# Patient Record
Sex: FEMALE | NOT HISPANIC OR LATINO | Employment: UNEMPLOYED | URBAN - NONMETROPOLITAN AREA
[De-identification: names, ages, dates, MRNs, and addresses within clinical notes are randomized per-mention and may not be internally consistent; named-entity substitution may affect disease eponyms.]

---

## 2021-07-13 ENCOUNTER — PREP FOR SURGERY (OUTPATIENT)
Dept: OTHER | Facility: HOSPITAL | Age: 56
End: 2021-07-13

## 2021-07-13 DIAGNOSIS — Z12.11 SCREEN FOR COLON CANCER: Primary | ICD-10-CM

## 2021-07-13 RX ORDER — DEXTROSE AND SODIUM CHLORIDE 5; .45 G/100ML; G/100ML
100 INJECTION, SOLUTION INTRAVENOUS CONTINUOUS PRN
Status: CANCELLED | OUTPATIENT
Start: 2021-08-13

## 2021-08-10 RX ORDER — QUETIAPINE FUMARATE 50 MG/1
50 TABLET, FILM COATED ORAL NIGHTLY
COMMUNITY

## 2021-08-10 RX ORDER — ERGOCALCIFEROL 1.25 MG/1
50000 CAPSULE ORAL
COMMUNITY

## 2021-08-10 RX ORDER — DULOXETIN HYDROCHLORIDE 30 MG/1
30 CAPSULE, DELAYED RELEASE ORAL EVERY MORNING
COMMUNITY
Start: 2021-05-22

## 2021-08-10 RX ORDER — PREGABALIN 50 MG/1
50 CAPSULE ORAL NIGHTLY
COMMUNITY

## 2021-08-10 RX ORDER — LANOLIN ALCOHOL/MO/W.PET/CERES
500 CREAM (GRAM) TOPICAL DAILY
COMMUNITY

## 2021-08-13 ENCOUNTER — HOSPITAL ENCOUNTER (OUTPATIENT)
Facility: HOSPITAL | Age: 56
Setting detail: HOSPITAL OUTPATIENT SURGERY
Discharge: HOME | End: 2021-08-13
Attending: SURGERY | Admitting: SURGERY

## 2021-08-13 ENCOUNTER — ANESTHESIA EVENT (OUTPATIENT)
Dept: GASTROENTEROLOGY | Facility: HOSPITAL | Age: 56
End: 2021-08-13

## 2021-08-13 ENCOUNTER — ANESTHESIA (OUTPATIENT)
Dept: GASTROENTEROLOGY | Facility: HOSPITAL | Age: 56
End: 2021-08-13

## 2021-08-13 VITALS
OXYGEN SATURATION: 98 % | RESPIRATION RATE: 20 BRPM | HEIGHT: 62 IN | HEART RATE: 54 BPM | SYSTOLIC BLOOD PRESSURE: 97 MMHG | WEIGHT: 159 LBS | DIASTOLIC BLOOD PRESSURE: 57 MMHG | BODY MASS INDEX: 29.26 KG/M2 | TEMPERATURE: 98.6 F

## 2021-08-13 DIAGNOSIS — Z12.11 SCREEN FOR COLON CANCER: ICD-10-CM

## 2021-08-13 PROCEDURE — 25010000002 PROPOFOL 10 MG/ML EMULSION: Performed by: NURSE ANESTHETIST, CERTIFIED REGISTERED

## 2021-08-13 RX ORDER — DEXTROSE AND SODIUM CHLORIDE 5; .45 G/100ML; G/100ML
100 INJECTION, SOLUTION INTRAVENOUS CONTINUOUS PRN
Status: DISCONTINUED | OUTPATIENT
Start: 2021-08-13 | End: 2021-08-13 | Stop reason: HOSPADM

## 2021-08-13 RX ORDER — LIDOCAINE HYDROCHLORIDE 20 MG/ML
INJECTION, SOLUTION INTRAVENOUS AS NEEDED
Status: DISCONTINUED | OUTPATIENT
Start: 2021-08-13 | End: 2021-08-13 | Stop reason: SURG

## 2021-08-13 RX ORDER — PROPOFOL 10 MG/ML
VIAL (ML) INTRAVENOUS AS NEEDED
Status: DISCONTINUED | OUTPATIENT
Start: 2021-08-13 | End: 2021-08-13 | Stop reason: SURG

## 2021-08-13 RX ADMIN — LIDOCAINE HYDROCHLORIDE 80 MG: 20 INJECTION, SOLUTION INTRAVENOUS at 09:12

## 2021-08-13 RX ADMIN — PROPOFOL 30 MG: 10 INJECTION, EMULSION INTRAVENOUS at 09:16

## 2021-08-13 RX ADMIN — PROPOFOL 20 MG: 10 INJECTION, EMULSION INTRAVENOUS at 09:19

## 2021-08-13 RX ADMIN — PROPOFOL 120 MG: 10 INJECTION, EMULSION INTRAVENOUS at 09:12

## 2021-08-13 RX ADMIN — DEXTROSE AND SODIUM CHLORIDE 100 ML/HR: 5; 450 INJECTION, SOLUTION INTRAVENOUS at 08:30

## 2021-08-13 NOTE — H&P
No chief complaint on file.      Aaron Rose is a 56 y.o. female referred today for evaluation for colonoscopy.  She notes no change in bowel habits, no blood in the stool.     Prior Colonoscopy:yes  Prior Polyps:no  Family History of Colon Cancer:yes, father in 70's with stage 3 colon cancer  On anticoagulation:no    Past Surgical History:   Procedure Laterality Date   • BREAST LUMPECTOMY     • TONSILLECTOMY     • TUBAL ABDOMINAL LIGATION       Past Medical History:   Diagnosis Date   • Anxiety    • Chronic pain    • Fibromyalgia      Social History     Socioeconomic History   • Marital status:      Spouse name: Not on file   • Number of children: Not on file   • Years of education: Not on file   • Highest education level: Not on file   Tobacco Use   • Smoking status: Never Smoker   • Smokeless tobacco: Never Used   Vaping Use   • Vaping Use: Never used   Substance and Sexual Activity   • Alcohol use: Yes     Comment: occasional    • Drug use: Never   • Sexual activity: Defer     History reviewed. No pertinent family history.  No Known Allergies    Home Medications:  Prior to Admission medications    Medication Sig Start Date End Date Taking? Authorizing Provider   DULoxetine (CYMBALTA) 30 MG capsule Take 30 mg by mouth Every Morning. 5/22/21  Yes Nish Turner MD   pregabalin (LYRICA) 50 MG capsule Take 50 mg by mouth Every Night.   Yes iNsh Turner MD   QUEtiapine (SEROquel) 50 MG tablet Take 50 mg by mouth Every Night.   Yes Nish Turner MD   Vitamin B12 (CYANOCOBALAMIN) 500 MCG tablet tablet Take 500 mcg by mouth Daily.   Yes Nish Turner MD   vitamin D (ERGOCALCIFEROL) 1.25 MG (05759 UT) capsule capsule Take 50,000 Units by mouth Every 7 (Seven) Days.   Yes Nish Turner MD       Review of Systems   Constitutional: Negative.    HENT: Negative for hearing loss, nosebleeds and trouble swallowing.    Respiratory: Negative for apnea, chest tightness and  shortness of breath.    Cardiovascular: Negative for chest pain and palpitations.   Gastrointestinal: Negative for abdominal distention, abdominal pain, blood in stool, constipation, diarrhea, nausea and vomiting.   Genitourinary: Negative for difficulty urinating, dysuria, frequency and urgency.   Musculoskeletal: Negative for back pain, joint swelling and neck pain.   Skin: Negative for rash.   Neurological: Negative for dizziness, seizures, weakness, light-headedness, numbness and headaches.   Hematological: Negative for adenopathy.   Psychiatric/Behavioral: Negative for agitation. The patient is not nervous/anxious.        Vitals:    08/13/21 0822   BP: 122/81   Pulse: 62   Resp: 18   Temp: 98.5 °F (36.9 °C)   SpO2: 97%       Physical Exam  Constitutional:       General: She is not in acute distress.     Appearance: She is well-developed.   HENT:      Head: Normocephalic and atraumatic.   Eyes:      General: No scleral icterus.     Conjunctiva/sclera: Conjunctivae normal.   Neck:      Thyroid: No thyromegaly.      Trachea: No tracheal deviation.   Cardiovascular:      Rate and Rhythm: Normal rate and regular rhythm.      Heart sounds: Normal heart sounds. No murmur heard.   No friction rub. No gallop.    Pulmonary:      Effort: Pulmonary effort is normal. No respiratory distress.      Breath sounds: Normal breath sounds. No stridor. No wheezing or rales.   Chest:      Chest wall: No tenderness.   Abdominal:      General: Bowel sounds are normal. There is no distension.      Palpations: Abdomen is soft. There is no mass.      Tenderness: There is no abdominal tenderness. There is no guarding or rebound.      Hernia: No hernia is present.   Musculoskeletal:         General: No deformity. Normal range of motion.      Cervical back: Normal range of motion and neck supple.   Lymphadenopathy:      Cervical: No cervical adenopathy.   Skin:     General: Skin is warm and dry.      Coloration: Skin is not pale.       Findings: No erythema or rash.      Nails: There is no clubbing.   Neurological:      Mental Status: She is alert and oriented to person, place, and time.   Psychiatric:         Behavior: Behavior normal.         Thought Content: Thought content normal.         Assessment     In need of screening colonoscopy.    Plan     Risks, benefits, rationale and prep for colonoscopy have been discussed with the patient.  The patient indicates understanding of these issues and agrees with the plan.

## 2021-08-13 NOTE — ANESTHESIA PREPROCEDURE EVALUATION
Anesthesia Evaluation     Patient summary reviewed and Nursing notes reviewed   NPO Solid Status: > 8 hours  NPO Liquid Status: > 8 hours           Airway   Mallampati: II  TM distance: >3 FB  Neck ROM: full  No difficulty expected  Dental - normal exam     Pulmonary - negative pulmonary ROS and normal exam   Cardiovascular - negative cardio ROS and normal exam        Neuro/Psych  (+) psychiatric history Anxiety and Depression,     GI/Hepatic/Renal/Endo - negative ROS     Musculoskeletal (-) negative ROS    Abdominal  - normal exam   Substance History - negative use     OB/GYN negative ob/gyn ROS         Other                      Anesthesia Plan    ASA 2     MAC     intravenous induction     Anesthetic plan, all risks, benefits, and alternatives have been provided, discussed and informed consent has been obtained with: patient.

## 2021-08-13 NOTE — ANESTHESIA POSTPROCEDURE EVALUATION
Patient: Aaron Rose    Procedure Summary     Date: 08/13/21 Room / Location: [facility] ENDOSCOPY 2 / [facility] ENDOSCOPY    Anesthesia Start: 0911 Anesthesia Stop: 0927    Procedure: COLONOSCOPY (N/A ) Diagnosis:       Screen for colon cancer      (Screen for colon cancer [Z12.11])    Surgeons: Ammon Jameson MD Provider: Kate Gregg CRNA    Anesthesia Type: MAC ASA Status: 2          Anesthesia Type: MAC    Vitals  No vitals data found for the desired time range.          Post Anesthesia Care and Evaluation    Patient location during evaluation: bedside  Patient participation: complete - patient participated  Level of consciousness: awake  Pain score: 0  Pain management: adequate  Airway patency: patent  Anesthetic complications: No anesthetic complications  PONV Status: none  Cardiovascular status: acceptable  Respiratory status: acceptable  Hydration status: acceptable

## 2025-01-09 RX ORDER — ROPINIROLE 1 MG/1
1 TABLET, FILM COATED ORAL 3 TIMES DAILY
COMMUNITY

## 2025-01-09 RX ORDER — CHOLECALCIFEROL (VITAMIN D3) 1250 MCG
CAPSULE ORAL
COMMUNITY

## 2025-01-09 RX ORDER — TRAZODONE HYDROCHLORIDE 150 MG/1
150 TABLET ORAL NIGHTLY
COMMUNITY

## 2025-01-09 RX ORDER — TRIAMCINOLONE ACETONIDE 5 MG/G
CREAM TOPICAL 3 TIMES DAILY
COMMUNITY

## 2025-01-09 RX ORDER — BUPROPION HYDROCHLORIDE 150 MG/1
150 TABLET ORAL EVERY MORNING
COMMUNITY

## 2025-01-16 ENCOUNTER — OFFICE VISIT (OUTPATIENT)
Dept: NEUROLOGY | Age: 60
End: 2025-01-16
Payer: COMMERCIAL

## 2025-01-16 VITALS
HEIGHT: 62 IN | SYSTOLIC BLOOD PRESSURE: 127 MMHG | OXYGEN SATURATION: 98 % | DIASTOLIC BLOOD PRESSURE: 87 MMHG | HEART RATE: 67 BPM | BODY MASS INDEX: 31.1 KG/M2 | WEIGHT: 169 LBS

## 2025-01-16 DIAGNOSIS — G47.00 INSOMNIA, UNSPECIFIED TYPE: ICD-10-CM

## 2025-01-16 DIAGNOSIS — D50.9 IRON DEFICIENCY ANEMIA, UNSPECIFIED IRON DEFICIENCY ANEMIA TYPE: ICD-10-CM

## 2025-01-16 DIAGNOSIS — G25.81 RESTLESS LEG SYNDROME: Primary | ICD-10-CM

## 2025-01-16 DIAGNOSIS — R53.83 OTHER FATIGUE: ICD-10-CM

## 2025-01-16 DIAGNOSIS — R20.8 DYSESTHESIA OF MULTIPLE SITES: ICD-10-CM

## 2025-01-16 DIAGNOSIS — M79.7 FIBROMYALGIA: ICD-10-CM

## 2025-01-16 PROCEDURE — 99215 OFFICE O/P EST HI 40 MIN: CPT | Performed by: PHYSICIAN ASSISTANT

## 2025-01-16 RX ORDER — RIZATRIPTAN BENZOATE 10 MG/1
TABLET, ORALLY DISINTEGRATING ORAL
COMMUNITY
Start: 2024-07-25

## 2025-01-16 RX ORDER — ZOLPIDEM TARTRATE 10 MG/1
1 TABLET, FILM COATED ORAL NIGHTLY PRN
COMMUNITY
Start: 2025-01-09

## 2025-01-16 RX ORDER — FAMOTIDINE 40 MG/1
TABLET, FILM COATED ORAL
COMMUNITY
Start: 2024-12-04

## 2025-01-16 RX ORDER — DULOXETIN HYDROCHLORIDE 60 MG/1
60 CAPSULE, DELAYED RELEASE ORAL DAILY
COMMUNITY

## 2025-01-16 RX ORDER — SUMATRIPTAN 50 MG/1
TABLET, FILM COATED ORAL
COMMUNITY

## 2025-01-16 NOTE — PATIENT INSTRUCTIONS
Healthy Sleep Habits:  Maintain a healthy diet  Exercise regularly but not within 4 hours of bedtime  Assure your bed and bedroom are quiet, relaxing, comfortable (including temperature, darkness)  Establish and maintain a relaxing bedtime routine  Use your bedroom only for sleep and intimacy  Avoid caffeine, nicotine, or other stimulants within 4 hours of bedtime  Avoid food or alcohol within 2 hours of bedtime  Reduce fluid intake before bedtime  Limit exposure to bright light in the evenings. Turn off electronic devices at least 1 hour before bedtime  Exposure to sunrise for 15 minutes each day and sunset for 15 minutes each evening  Broad spectrum light-is a type of light that covers a wide range of wavelengths. This type of light is designed to mimic natural sunlight and is often used to treat various health conditions like seasonal affective disorder and sleep disorders.  Broad spectrum light-ensure the light box you choose emits 10,000 lux of light and filters 99% of UV rays. Position the light box at eye level or higher away from your face. The light should be positioned to the left or right, not directly in front of you. Use the light box within the first hour of waking up in the morning for 20-30 minutes. This helps regulate your circadian rhythm and improve mood. Use the light box daily. Always follow the 's instructions for optimal use and safety.

## 2025-01-16 NOTE — PROGRESS NOTES
REVIEW OF SYSTEMS    Constitutional: []Fever []Sweats []Chills [] Recent Injury [x] Denies all unless marked  HEENT:[]Headache  [] Head Injury [] Hearing Loss  [] Sore Throat  [] Ear Ache [x] Denies all unless marked  Spine:  [] Neck pain  [] Back pain  [] Sciaticia  [x] Denies all unless marked  Cardiovascular:[]Heart Disease []Palpitations [] Chest Pain   [x] Denies all unless marked  Pulmonary: []Shortness of Breath []Cough   [x] Denies all unless marked  Psychiatric/Behavioral:[] Depression [] Anxiety [x] Denies all unless marked  Gastrointestinal: []Nausea  []Vomiting  []Abdominal Pain  []Constipation  []Diarrhea  [x] Denies all unless marked  Genitourinary:   [] Frequency  [] Urgency  [] Dysuria [] Incontinence  [x] Denies all unless marked  Extremities: []Pain  []Swelling  [x] Denies all unless marked  Musculoskeletal: [] Myalgias  [] Joint Pain  [] Arthritis [] Muscle Cramps [] Muscle Twitches  [x] Denies all unless marked  Sleep: [x]Insomnia[x]Snoring [x]Restless Legs  []Sleep Apnea  []Daytime Sleepiness  [x] Denies all unless marked  Skin:[] Rash [] Color Change [x] Denies all unless marked   Neurological:[]Visual Disturbance [] Memory Loss []Loss of Balance []Slurred Speech []Weakness []Seizures  [] Dizziness [x] Denies all unless marked     
months-pt to call for lab results, as she will be obtaining the labs at an outside facility.      The patient indicates understanding of the above issues and agrees with the care plan, but desires to initiate lab studies with results review prior to additional diagnostic studies.       I spent 45 minutes caring for Bibi Ca  on this date of service. This time includes time spent by me in the following activities:preparing for the visit, reviewing tests, performing a medically appropriate examination and/or evaluation , counseling and educating the patient/family/caregiver, ordering medications, tests, or procedures, documenting information in the medical record and care coordination      The patient (or guardian, if applicable) and other individuals in attendance with the patient were advised that Artificial Intelligence will be utilized during this visit to record, process the conversation to generate a clinical note, and support improvement of the AI technology. The patient (or guardian, if applicable) and other individuals in attendance at the appointment consented to the use of AI, including the recording.

## 2025-01-18 PROBLEM — G47.00 INSOMNIA: Status: ACTIVE | Noted: 2025-01-18

## 2025-01-18 PROBLEM — R20.8 DYSESTHESIA OF MULTIPLE SITES: Status: ACTIVE | Noted: 2025-01-18

## 2025-01-27 ENCOUNTER — TELEPHONE (OUTPATIENT)
Dept: NEUROLOGY | Age: 60
End: 2025-01-27

## 2025-01-28 NOTE — TELEPHONE ENCOUNTER
Spoke with patient, letting her know Andreia would like her to complete additional labs. Patient requested the orders to be sent to her PCP.

## 2025-01-28 NOTE — TELEPHONE ENCOUNTER
Reviewed PCP labs from 11/2024. I ordered additional labs and would like her to obtain those, as well please.

## 2025-02-24 ENCOUNTER — TELEPHONE (OUTPATIENT)
Dept: NEUROLOGY | Age: 60
End: 2025-02-24

## 2025-02-24 NOTE — TELEPHONE ENCOUNTER
Pt called requesting info if she can get labs drawn here at [facility]. Apparently there has been some issue with her being able to get her labs drawn at different facilities. She advised her pcp is on maternity leave and that office would not draw labs on her since they didn't order it. She would like to come to [facility] and I advised her where the lab is. Advised her if she had any trouble to let us know before going home. She voiced understanding  Tiera santos

## 2025-02-27 DIAGNOSIS — D50.9 IRON DEFICIENCY ANEMIA, UNSPECIFIED IRON DEFICIENCY ANEMIA TYPE: ICD-10-CM

## 2025-02-27 DIAGNOSIS — R53.83 OTHER FATIGUE: ICD-10-CM

## 2025-02-27 LAB
ALBUMIN SERPL-MCNC: 4.5 G/DL (ref 3.5–5.2)
ALP SERPL-CCNC: 103 U/L (ref 35–104)
ALT SERPL-CCNC: 19 U/L (ref 5–33)
ANION GAP SERPL CALCULATED.3IONS-SCNC: 10 MMOL/L (ref 8–16)
AST SERPL-CCNC: 19 U/L (ref 5–32)
BASOPHILS # BLD: 0.1 K/UL (ref 0–0.2)
BASOPHILS NFR BLD: 0.8 % (ref 0–1)
BILIRUB SERPL-MCNC: 0.3 MG/DL (ref 0.2–1.2)
BUN SERPL-MCNC: 12 MG/DL (ref 6–20)
CALCIUM SERPL-MCNC: 9.6 MG/DL (ref 8.6–10)
CHLORIDE SERPL-SCNC: 102 MMOL/L (ref 98–107)
CO2 SERPL-SCNC: 30 MMOL/L (ref 22–29)
CREAT SERPL-MCNC: 0.7 MG/DL (ref 0.5–0.9)
EOSINOPHIL # BLD: 0.2 K/UL (ref 0–0.6)
EOSINOPHIL NFR BLD: 2.2 % (ref 0–5)
ERYTHROCYTE [DISTWIDTH] IN BLOOD BY AUTOMATED COUNT: 12.5 % (ref 11.5–14.5)
FERRITIN SERPL-MCNC: 492.6 NG/ML (ref 13–150)
FOLATE SERPL-MCNC: 17.7 NG/ML (ref 4.8–37.3)
GLUCOSE SERPL-MCNC: 96 MG/DL (ref 70–99)
HCT VFR BLD AUTO: 41.6 % (ref 37–47)
HGB BLD-MCNC: 13.3 G/DL (ref 12–16)
IMM GRANULOCYTES # BLD: 0 K/UL
IRON SATN MFR SERPL: 23 % (ref 15–50)
IRON SERPL-MCNC: 58 UG/DL (ref 37–145)
LYMPHOCYTES # BLD: 2.4 K/UL (ref 1.1–4.5)
LYMPHOCYTES NFR BLD: 30.6 % (ref 20–40)
MCH RBC QN AUTO: 30 PG (ref 27–31)
MCHC RBC AUTO-ENTMCNC: 32 G/DL (ref 33–37)
MCV RBC AUTO: 93.7 FL (ref 81–99)
MONOCYTES # BLD: 0.6 K/UL (ref 0–0.9)
MONOCYTES NFR BLD: 8.2 % (ref 0–10)
NEUTROPHILS # BLD: 4.5 K/UL (ref 1.5–7.5)
NEUTS SEG NFR BLD: 57.8 % (ref 50–65)
PLATELET # BLD AUTO: 324 K/UL (ref 130–400)
PMV BLD AUTO: 9.8 FL (ref 9.4–12.3)
POTASSIUM SERPL-SCNC: 4.5 MMOL/L (ref 3.5–5.1)
PROT SERPL-MCNC: 8.1 G/DL (ref 6.4–8.3)
RBC # BLD AUTO: 4.44 M/UL (ref 4.2–5.4)
SODIUM SERPL-SCNC: 142 MMOL/L (ref 136–145)
T4 FREE SERPL-MCNC: 0.92 NG/DL (ref 0.93–1.7)
TIBC SERPL-MCNC: 257 UG/DL (ref 250–400)
TSH SERPL DL<=0.005 MIU/L-ACNC: 1.93 UIU/ML (ref 0.27–4.2)
VIT B12 SERPL-MCNC: 788 PG/ML (ref 232–1245)
WBC # BLD AUTO: 7.8 K/UL (ref 4.8–10.8)

## 2025-02-28 ENCOUNTER — TELEPHONE (OUTPATIENT)
Dept: NEUROLOGY | Age: 60
End: 2025-02-28

## 2025-02-28 NOTE — TELEPHONE ENCOUNTER
----- Message from CALVIN Martinez sent at 2/27/2025  9:35 PM CST -----  Please let her know that the ferritin level is high. She has a hx of anemia. This may suggest iron overload if she is supplementing with iron? Sometimes an elevated ferritin can be seen if liver injury but her liver enzymes were normal. Also elevated ferritin can be seen in inflammation. I would recommend follow up with her hematologist who has managed the anemia.  So the RLS is not related to ferritin levels because they are not low. Does she want to try another medication for RLS?   She had also mentioned snoring and insomnia. Will she consider a sleep study?  She also had c/o episodic tingling. I recommended to consider further evaluation with MRI brain and C-spine. Does she want to proceed with that?  The T4 was marginally decreased but not significant for thyroid disease, as her TSH is normal.

## 2025-02-28 NOTE — TELEPHONE ENCOUNTER
Spoke with patient, she understands results given above. Patient stated she was dismissed from hematologist because her levels were back to normal. Can you place a referral to [facility], Dr. Bailey?    Patient would like to try a new RLS medication, she would also like to proceed with a sleep study and the imaging.

## 2025-03-03 DIAGNOSIS — R20.8 DYSESTHESIA OF MULTIPLE SITES: Primary | ICD-10-CM

## 2025-03-03 DIAGNOSIS — G47.00 INSOMNIA, UNSPECIFIED TYPE: ICD-10-CM

## 2025-03-03 DIAGNOSIS — R29.2 HOFFMAN SIGN PRESENT: ICD-10-CM

## 2025-03-03 DIAGNOSIS — R79.89 ELEVATED FERRITIN LEVEL: Primary | ICD-10-CM

## 2025-03-03 DIAGNOSIS — R29.2 HYPERREFLEXIC: ICD-10-CM

## 2025-03-03 DIAGNOSIS — R06.83 SNORING: Primary | ICD-10-CM

## 2025-03-03 DIAGNOSIS — R53.83 OTHER FATIGUE: ICD-10-CM

## 2025-03-03 DIAGNOSIS — D50.9 IRON DEFICIENCY ANEMIA, UNSPECIFIED IRON DEFICIENCY ANEMIA TYPE: ICD-10-CM

## 2025-03-03 DIAGNOSIS — G25.81 RESTLESS LEG SYNDROME: ICD-10-CM

## 2025-03-04 NOTE — TELEPHONE ENCOUNTER
Spoke with patient, let her know franco ordered MRI brain and C-Spine, she was given [phone] to [facility] and schedule.  She is aware the sleep lab will call her to set up appt for the sleep study and Franco has dropped the referral for her Hematologist and that office should be reaching out. I also voiced Franco wants to wait til after she gets sleep study results to change medication.

## 2025-03-13 ENCOUNTER — HOSPITAL ENCOUNTER (OUTPATIENT)
Dept: MRI IMAGING | Age: 60
Discharge: HOME | End: 2025-03-13
Payer: COMMERCIAL

## 2025-03-13 DIAGNOSIS — R29.2 HOFFMAN SIGN PRESENT: ICD-10-CM

## 2025-03-13 DIAGNOSIS — R20.8 DYSESTHESIA OF MULTIPLE SITES: ICD-10-CM

## 2025-03-13 DIAGNOSIS — R29.2 HYPERREFLEXIC: ICD-10-CM

## 2025-03-13 PROCEDURE — 6360000004 HC RX CONTRAST MEDICATION: Performed by: PHYSICIAN ASSISTANT

## 2025-03-13 PROCEDURE — 72156 MRI NECK SPINE W/O & W/DYE: CPT

## 2025-03-13 PROCEDURE — 70553 MRI BRAIN STEM W/O & W/DYE: CPT

## 2025-03-13 PROCEDURE — A9577 INJ MULTIHANCE: HCPCS | Performed by: PHYSICIAN ASSISTANT

## 2025-03-13 RX ADMIN — GADOBENATE DIMEGLUMINE 16 ML: 529 INJECTION, SOLUTION INTRAVENOUS at 13:03

## 2025-03-27 ENCOUNTER — RESULTS FOLLOW-UP (OUTPATIENT)
Dept: NEUROLOGY | Age: 60
End: 2025-03-27

## 2025-03-27 DIAGNOSIS — R20.8 DYSESTHESIA OF MULTIPLE SITES: Primary | ICD-10-CM

## 2025-03-27 DIAGNOSIS — R29.2 ABNORMAL REFLEXES: ICD-10-CM

## 2025-03-27 DIAGNOSIS — M79.7 FIBROMYALGIA: ICD-10-CM

## 2025-03-27 DIAGNOSIS — R53.83 OTHER FATIGUE: ICD-10-CM

## 2025-04-28 ENCOUNTER — HOSPITAL ENCOUNTER (OUTPATIENT)
Dept: SLEEP CENTER | Age: 60
Discharge: HOME | End: 2025-04-30
Payer: COMMERCIAL

## 2025-04-28 DIAGNOSIS — R53.83 OTHER FATIGUE: ICD-10-CM

## 2025-04-28 DIAGNOSIS — G47.00 INSOMNIA, UNSPECIFIED TYPE: ICD-10-CM

## 2025-04-28 DIAGNOSIS — G25.81 RESTLESS LEG SYNDROME: ICD-10-CM

## 2025-04-28 DIAGNOSIS — R06.83 SNORING: ICD-10-CM

## 2025-04-28 PROCEDURE — 95810 POLYSOM 6/> YRS 4/> PARAM: CPT

## 2025-04-29 NOTE — PROGRESS NOTES
[facility]  [address] [place]  [address]  Phone [phone] Fax [phone]     Sleep Study Technician Review    Patient Name:  Bibi Ca  :   1965  Referring Provider: Andreia Quiroga PA    [facility] Fall Risk Assessment  Have you fallen in the past year? YES[] NO[x]  Do you feel unsteady when standing or walking? YES[] NO[x]  Are you worried about falling? YES[] NO[x]   aFall Risk screening requirement has been met  Not at risk for falls.    Brief History:  Bibi Ca is a 60 y.o. female with a history of Anxiety, Depression, Fatigue, Fibromylagia, GERDS, Migraine, Insomnia, RLS and Snoring who has been referred for a sleep study. She has a history of 3 natural childbirths and recalls experiencing severe restless leg syndrome (RLS) during her second pregnancy in . The RLS was so severe that it disrupted her sleep, causing significant sleep deprivation. She describes the sensation as a sudden jerking movement in her legs as she begins to relax and fall asleep. She also reports experiencing RLS symptoms in her right leg while sitting and relaxing, such as during Baptism or waiting for an appointment. She reports that her RLS symptoms are not consistent and can be triggered by both prolonged standing and sitting. She finds that walking does not seem to exacerbate her symptoms. She takes her medication 2 hours before bedtime, which helps her fall asleep but often results in her falling asleep on the couch. She avoids napping during the day to prevent nighttime sleep disturbances. She once increased her ropinirole dose from 4 mg to 6 mg due to severe symptoms, but this resulted in daytime drowsiness. She rates her excessive daytime somnolence as 2 on the [place] Sleepiness Scale. She reports feeling fatigued throughout the day but does not fall asleep or doze off when sedentary. She has been experiencing RLS symptoms in her upper extremities and body

## 2025-05-22 ENCOUNTER — TELEPHONE (OUTPATIENT)
Dept: NEUROLOGY | Age: 60
End: 2025-05-22

## 2025-05-22 NOTE — TELEPHONE ENCOUNTER
The lab results for the protein electrophoresis and the Lyme are not clear as to the result, it says see scanned report. Would you please check into this?

## 2025-05-26 DIAGNOSIS — G47.33 SLEEP APNEA, OBSTRUCTIVE: Primary | ICD-10-CM

## 2025-05-27 ENCOUNTER — PATIENT MESSAGE (OUTPATIENT)
Dept: NEUROLOGY | Age: 60
End: 2025-05-27

## 2025-06-03 ENCOUNTER — TELEPHONE (OUTPATIENT)
Dept: NEUROLOGY | Age: 60
End: 2025-06-03

## 2025-06-03 RX ORDER — DOXYCYCLINE HYCLATE 100 MG
TABLET ORAL
Qty: 28 TABLET | Refills: 0 | Status: SHIPPED | OUTPATIENT
Start: 2025-06-03

## 2025-06-04 NOTE — TELEPHONE ENCOUNTER
Please let her know that we finally received the rest of her lab results. The SPEP is normal. The Lymes titer does indicate that it is negative for current Lyme disease but it did show a positive IgM P39 antibody result which can indicate that she was exposed to the bacteria that causes Lyme disease, Borrelia burgdorferi fairly recently. However, this result alone done not confirm active Lyme disease. Some people may have a positive result without current symptoms or the finding might represent past exposure. She did express multiple arthralgias.Could repeat the test but I would recommend a referral to Infectious Disease, which would be Dr. Bora Villalobos. I am also going to e-scribed Doxycyline 100 mg bid x 14 days. Please let me know if she wants to proceed with the referral to Dr. Villalobos.

## 2025-06-05 DIAGNOSIS — R89.9 ABNORMAL LABORATORY TEST RESULT: ICD-10-CM

## 2025-06-05 DIAGNOSIS — A69.20 LYME DISEASE: Primary | ICD-10-CM

## 2025-06-05 DIAGNOSIS — M25.50 ARTHRALGIA, UNSPECIFIED JOINT: ICD-10-CM

## 2025-06-18 DIAGNOSIS — M50.30 DEGENERATIVE DISC DISEASE, CERVICAL: ICD-10-CM

## 2025-06-18 DIAGNOSIS — R29.2 HYPERREFLEXIA: ICD-10-CM

## 2025-06-18 DIAGNOSIS — M48.02 FORAMINAL STENOSIS OF CERVICAL REGION: Primary | ICD-10-CM

## 2025-06-18 DIAGNOSIS — R20.8 DYSESTHESIA OF MULTIPLE SITES: ICD-10-CM

## 2025-06-20 NOTE — PROGRESS NOTES
[facility] [place] [facility] [place] Consult [place]    Patient:  Aaron Rose  YOB: 1965  MRN: [id number]   Primary Care Physician: Keke Morse MD  Referring Physician: Tammie Nava PA-C     Chief Complaint: Lyme disease, unspecified   >>> Per patient at rest involuntary movements, head, shoulder, legs. She states that she has extreme fatigue,startled easily.<<<< C DONNA Zamora    Interval History/HPI: Pleasant 60-year-old woman.  She was referred because of Lyme testing.  When asking her for a chief complaint/description of her symptoms she had initially focused on a prior history of fibromyalgia and fatigue.  She indicates she had been diagnosed with fibromyalgia quite a while back.  She indicates she thought a lot of her symptoms were related to fibro-.  She indicates she saw her primary care physician last fall.  She indicates she was diagnosed with restless leg.  She was started on some medication treatment.  She was also referred to neurology.  She indicates when she saw neurology there were some ferritin issues and also an anemia problem.  From what she describes they had done a large panel of tests and at some point she was told she had unspecified Lyme disease.  She indicates she is not sure what that meant.  I did not get the sense that she has had any antibiotic treatment of any kind.  As we talked further I tried to get her to focus more on the symptoms that led to this appointment and describe when she felt those symptoms started.  She indicated in January she started to develop some uncontrolled movements first involving her right foot and right leg and then subsequently right shoulder and occasionally her right face.  She indicates she looked up some words to try and describe the symptoms and she indicated involuntary jerking, shuttering, or startling movements.  She notices it more when she is sleeping but she is not sure if that is because when she is up and moving during the day  she just does not notice it as much is when she is at rest.  She indicates she has had MRIs I believe the brain and spine and she reports a diagnosis of some cervical spinal stenosis for which she has been referred to physical therapy.  She also indicates as a part of her evaluation she was referred for a sleep study.  She indicates she was diagnosed with sleep apnea but has not yet had a titration study to adjust CPAP/BiPAP for treatment.  She indicates the question issue of possible Lyme disease came up as result of laboratory testing.  She did not report a lot of outdoor exposure, but she does indicate her  does work as a farmer and brings home ticks not infrequently.    Allergies: No Known Allergies    Current Scheduled Medications:   Current Outpatient Medications on File Prior to Visit   Medication Sig    DULoxetine (CYMBALTA) 30 MG capsule Take 1 capsule by mouth Every Morning.    Nutritional Supplements (JUICE PLUS FIBRE PO)     pregabalin (LYRICA) 50 MG capsule Take 1 capsule by mouth Every Night.    rOPINIRole (REQUIP) 4 MG tablet Take 1 tablet by mouth every night at bedtime.    SUMAtriptan (IMITREX) 50 MG tablet Take 1 tablet by mouth 1 (One) Time As Needed.    Vitamin B12 (CYANOCOBALAMIN) 500 MCG tablet tablet Take 1 tablet by mouth Daily.    vitamin D (ERGOCALCIFEROL) 1.25 MG (33843 UT) capsule capsule Take 1 capsule by mouth Every 7 (Seven) Days.    zolpidem (Ambien) 10 MG tablet Take 1 tablet by mouth At Night As Needed.    QUEtiapine (SEROquel) 50 MG tablet Take 1 tablet by mouth Every Night.     No current facility-administered medications on file prior to visit.     Venous Access Review  Line/IV site: No current IV Access    Antimicrobial Review  Currently on antibiotics/antifungals: YES/NO: NO  Start Date of Therapy: 06-: doxycyline   If therapy completed, date complete: 06-      Past Medical History:   Diagnosis Date    Anxiety     Chronic pain     Fibromyalgia    She had  "had a prior history of anemia for which she received iron transfusion per chart review.  She also had a prior history of MDS diagnosis which she indicates was reversed.  She has had some history of depression/anxiety.  She reports a relatively new diagnosis of sleep apnea.    No past medical history pertinent negatives.  Past Surgical History:   Procedure Laterality Date    BREAST LUMPECTOMY      COLONOSCOPY N/A 8/13/2021    Procedure: COLONOSCOPY;  Surgeon: Ammon Jameson MD;  Location: [facility] ENDOSCOPY;  Service: General;  Laterality: N/A;    TONSILLECTOMY      TUBAL ABDOMINAL LIGATION       Family History   Problem Relation Age of Onset    Other (back promblems) Mother     Cancer Father      Social History     Socioeconomic History    Marital status:    Tobacco Use    Smoking status: Never    Smokeless tobacco: Never   Vaping Use    Vaping status: Never Used   Substance and Sexual Activity    Alcohol use: Yes     Comment: occasional     Drug use: Never    Sexual activity: Defer   She indicates she has had some stressful events.  She indicates her daughter moved out of their home at age 18 about 3 years ago.  They do not have any contact with her.  She described that situation bothering her.  She does have 2 sons who she indicates she remains in normal contact with.    Exposure History: She indicates her  works as a farmer and she has had some tick exposure through him.  She did not report any other significant exposures.    Review of Systems See HPI.    Vital Signs:  BP (!) 156/105 (BP Location: Left arm, Patient Position: Sitting, Cuff Size: Adult)   Pulse 88   Temp 98.5 °F (36.9 °C) (Oral)   Ht 157.5 cm (62\")   Wt 78.6 kg (173 lb 3.2 oz)   SpO2 97%   BMI 31.68 kg/m²     Physical Exam  Vital signs - reviewed.  Alert, pleasant, tired and somewhat anxious appearing but in no distress.  She would occasionally have a slight involuntary movement of her right arm such as rolling the right " shoulder and right arm.  I did not see in lot of other involuntary movements during the course of our discussion today.  I saw maybe 1 or 2 apparent involuntary movements involving her right side.  Lungs were clear to auscultation without crackles  Heart regular rhythm without murmur  Abdomen is soft and nontender without hepatosplenomegaly  Extremities without edema    Lab/Imaging/Other Information:     Labs from April 23, 2025 reviewed.  A copy of the lab given to patient and discussed with patient.  Progress notes outlined below reviewed as well.  LABORATORY - SCAN - LABS FROM [facility] 4/23/25 (04/23/2025)     Lyme IgG done April 23, 2025:        PROGRESS NOTES - SCAN - PROG NOTE_[facility]_04/16/25 (04/16/2025)     PROGRESS NOTES - SCAN - PROG NOTE_[facility]_03/31/25 (03/31/2025)     LABORATORY - SCAN - LABS COLLECTED FROM [facility] (03/31/2025)     NEUROLOGY - SCAN - [facility] -01/16/2025 (01/16/2025)       Impression & Recommendations:   Diagnoses and all orders for this visit:    1. Fatigue, unspecified type (Primary)  -     C-reactive Protein; Future  -     CBC & Differential; Future  -     T4, Free; Future  -     TSH; Future  -     Ferritin; Future  -     Comprehensive Metabolic Panel; Future    2. Multifocal myoclonus    3. Borderline results on serologic testing for Lyme disease    Discussed her lab results with her.  Explained her Lyme test is actually a negative test.  I explained with only 1 of 3 bands in the IgM portion that portion would be negative.  I explained with 0 of 10 bands (not the 5 of 10 bands required) in the IgG portion that her Lyme IgG is also negative.  Her history would be very atypical for Lyme.  Her history would also be atypical for other types of infection.  At this point I lean against infection as a cause for her symptoms.  Would recommend ongoing neurology evaluation for her problems with multifocal myoclonus.  Will check laboratory  work today to further evaluate fatigue.  She can call for lab results.  Would encourage her to keep follow-up with her primary care physician and neurology.  At this point I lean against any type of infection but I would be happy to reassess at any point if the patient or her other treating physicians would like me to do so.    Follow Up:     There are no Patient Instructions on file for this visit.  Return for She can follow-up with infectious diseases as needed, but happy to reassess at any point..  Patient was provided After Visit Summary.     Benoit Ching MD    CC: JOSE Baca PA

## 2025-06-23 DIAGNOSIS — M48.02 FORAMINAL STENOSIS OF CERVICAL REGION: Primary | ICD-10-CM

## 2025-06-23 DIAGNOSIS — R20.8 DYSESTHESIA OF MULTIPLE SITES: ICD-10-CM

## 2025-06-23 DIAGNOSIS — M50.30 DEGENERATIVE DISC DISEASE, CERVICAL: ICD-10-CM

## 2025-06-23 DIAGNOSIS — R29.2 HYPERREFLEXIA: ICD-10-CM

## 2025-06-24 ENCOUNTER — OFFICE VISIT (OUTPATIENT)
Age: 60
End: 2025-06-24
Payer: MEDICAID

## 2025-06-24 ENCOUNTER — LAB (OUTPATIENT)
Dept: LAB | Facility: HOSPITAL | Age: 60
End: 2025-06-24
Payer: MEDICAID

## 2025-06-24 VITALS
BODY MASS INDEX: 31.87 KG/M2 | TEMPERATURE: 98.5 F | DIASTOLIC BLOOD PRESSURE: 105 MMHG | HEART RATE: 88 BPM | HEIGHT: 62 IN | SYSTOLIC BLOOD PRESSURE: 156 MMHG | WEIGHT: 173.2 LBS | OXYGEN SATURATION: 97 %

## 2025-06-24 DIAGNOSIS — R76.8 BORDERLINE RESULTS ON SEROLOGIC TESTING FOR LYME DISEASE: ICD-10-CM

## 2025-06-24 DIAGNOSIS — R53.83 FATIGUE, UNSPECIFIED TYPE: ICD-10-CM

## 2025-06-24 DIAGNOSIS — R53.83 FATIGUE, UNSPECIFIED TYPE: Primary | ICD-10-CM

## 2025-06-24 DIAGNOSIS — G25.3 MULTIFOCAL MYOCLONUS: ICD-10-CM

## 2025-06-24 LAB
ALBUMIN SERPL-MCNC: 4.4 G/DL (ref 3.5–5.2)
ALBUMIN/GLOB SERPL: 1.3 G/DL
ALP SERPL-CCNC: 98 U/L (ref 39–117)
ALT SERPL W P-5'-P-CCNC: 29 U/L (ref 1–33)
ANION GAP SERPL CALCULATED.3IONS-SCNC: 12 MMOL/L (ref 5–15)
AST SERPL-CCNC: 28 U/L (ref 1–32)
BASOPHILS # BLD AUTO: 0.07 10*3/MM3 (ref 0–0.2)
BASOPHILS NFR BLD AUTO: 0.9 % (ref 0–1.5)
BILIRUB SERPL-MCNC: 0.2 MG/DL (ref 0–1.2)
BUN SERPL-MCNC: 10.5 MG/DL (ref 8–23)
BUN/CREAT SERPL: 15.9 (ref 7–25)
CALCIUM SPEC-SCNC: 9.6 MG/DL (ref 8.6–10.5)
CHLORIDE SERPL-SCNC: 103 MMOL/L (ref 98–107)
CO2 SERPL-SCNC: 26 MMOL/L (ref 22–29)
CREAT SERPL-MCNC: 0.66 MG/DL (ref 0.57–1)
CRP SERPL-MCNC: 1.72 MG/DL (ref 0–0.5)
DEPRECATED RDW RBC AUTO: 41.2 FL (ref 37–54)
EGFRCR SERPLBLD CKD-EPI 2021: 100.6 ML/MIN/1.73
EOSINOPHIL # BLD AUTO: 0.17 10*3/MM3 (ref 0–0.4)
EOSINOPHIL NFR BLD AUTO: 2.1 % (ref 0.3–6.2)
ERYTHROCYTE [DISTWIDTH] IN BLOOD BY AUTOMATED COUNT: 12.5 % (ref 12.3–15.4)
FERRITIN SERPL-MCNC: 466.3 NG/ML (ref 13–150)
GLOBULIN UR ELPH-MCNC: 3.4 GM/DL
GLUCOSE SERPL-MCNC: 105 MG/DL (ref 65–99)
HCT VFR BLD AUTO: 39.7 % (ref 34–46.6)
HGB BLD-MCNC: 12.9 G/DL (ref 12–15.9)
IMM GRANULOCYTES # BLD AUTO: 0.04 10*3/MM3 (ref 0–0.05)
IMM GRANULOCYTES NFR BLD AUTO: 0.5 % (ref 0–0.5)
LYMPHOCYTES # BLD AUTO: 2.39 10*3/MM3 (ref 0.7–3.1)
LYMPHOCYTES NFR BLD AUTO: 29.6 % (ref 19.6–45.3)
MCH RBC QN AUTO: 29.5 PG (ref 26.6–33)
MCHC RBC AUTO-ENTMCNC: 32.5 G/DL (ref 31.5–35.7)
MCV RBC AUTO: 90.6 FL (ref 79–97)
MONOCYTES # BLD AUTO: 0.69 10*3/MM3 (ref 0.1–0.9)
MONOCYTES NFR BLD AUTO: 8.6 % (ref 5–12)
NEUTROPHILS NFR BLD AUTO: 4.71 10*3/MM3 (ref 1.7–7)
NEUTROPHILS NFR BLD AUTO: 58.3 % (ref 42.7–76)
NRBC BLD AUTO-RTO: 0 /100 WBC (ref 0–0.2)
PLATELET # BLD AUTO: 302 10*3/MM3 (ref 140–450)
PMV BLD AUTO: 9.9 FL (ref 6–12)
POTASSIUM SERPL-SCNC: 4.1 MMOL/L (ref 3.5–5.2)
PROT SERPL-MCNC: 7.8 G/DL (ref 6–8.5)
RBC # BLD AUTO: 4.38 10*6/MM3 (ref 3.77–5.28)
SODIUM SERPL-SCNC: 141 MMOL/L (ref 136–145)
T4 FREE SERPL-MCNC: 0.85 NG/DL (ref 0.92–1.68)
TSH SERPL DL<=0.05 MIU/L-ACNC: 2.91 UIU/ML (ref 0.27–4.2)
WBC NRBC COR # BLD AUTO: 8.07 10*3/MM3 (ref 3.4–10.8)

## 2025-06-24 PROCEDURE — 99244 OFF/OP CNSLTJ NEW/EST MOD 40: CPT | Performed by: INTERNAL MEDICINE

## 2025-06-24 PROCEDURE — 80053 COMPREHEN METABOLIC PANEL: CPT

## 2025-06-24 PROCEDURE — 82728 ASSAY OF FERRITIN: CPT

## 2025-06-24 PROCEDURE — 84443 ASSAY THYROID STIM HORMONE: CPT

## 2025-06-24 PROCEDURE — 85025 COMPLETE CBC W/AUTO DIFF WBC: CPT

## 2025-06-24 PROCEDURE — 86140 C-REACTIVE PROTEIN: CPT

## 2025-06-24 PROCEDURE — 36415 COLL VENOUS BLD VENIPUNCTURE: CPT

## 2025-06-24 PROCEDURE — 84439 ASSAY OF FREE THYROXINE: CPT

## 2025-06-24 RX ORDER — ROPINIROLE 4 MG/1
1 TABLET, FILM COATED ORAL
COMMUNITY

## 2025-06-24 RX ORDER — SUMATRIPTAN 50 MG/1
50 TABLET, FILM COATED ORAL ONCE AS NEEDED
COMMUNITY

## 2025-06-24 RX ORDER — ZOLPIDEM TARTRATE 10 MG/1
10 TABLET ORAL NIGHTLY PRN
COMMUNITY
Start: 2025-01-09

## 2025-06-25 ENCOUNTER — TELEPHONE (OUTPATIENT)
Age: 60
End: 2025-06-25

## 2025-06-25 NOTE — TELEPHONE ENCOUNTER
Patient called today and left a message asking if Dr. Ching was going to refer her to a different neurologist.  She asked for a return call at [phone].

## 2025-06-27 NOTE — TELEPHONE ENCOUNTER
I called and spoke with patient and told her I received her message and I am waiting for Dr. Ching to finish her note from this week.  I told her I will check with him next week and let her know something as soon as possible.

## 2025-07-07 ENCOUNTER — TELEPHONE (OUTPATIENT)
Age: 60
End: 2025-07-07

## 2025-07-07 NOTE — TELEPHONE ENCOUNTER
Patient called and left a voicemail message today at 2:18 PM calling to find out about her lab results and to see if Dr. Ching has referred her to a neurologist.  She asked for a return call at [phone].      16:48 CDT  I returned her call and informed her that Dr. Ching spoke with her primary care physician in [place] Dr. Morse.  I reviewed my findings with her.  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  We discussed assisting and referring her for additional neurology opinion.  Dr. Morse is down to see her next week.  She is also going to work with the patient to help get her additional neurology opinion.  Patient confirmed she has an appt with Dr. Morse next week.  I will make sure her note and lab results are send to Dr. Morse for her to review.  She said she appreciated all of my communication.      Labs and consult note were faxed to Dr. Morse at [phone].

## 2025-07-21 ENCOUNTER — TELEPHONE (OUTPATIENT)
Dept: NEUROLOGY | Age: 60
End: 2025-07-21

## 2025-08-04 ENCOUNTER — OFFICE VISIT (OUTPATIENT)
Dept: NEUROLOGY | Age: 60
End: 2025-08-04
Payer: COMMERCIAL

## 2025-08-04 VITALS
SYSTOLIC BLOOD PRESSURE: 156 MMHG | RESPIRATION RATE: 16 BRPM | OXYGEN SATURATION: 96 % | WEIGHT: 169 LBS | BODY MASS INDEX: 31.1 KG/M2 | HEART RATE: 100 BPM | HEIGHT: 62 IN | DIASTOLIC BLOOD PRESSURE: 90 MMHG

## 2025-08-04 DIAGNOSIS — R25.9 INVOLUNTARY MOVEMENTS: ICD-10-CM

## 2025-08-04 DIAGNOSIS — G25.81 RESTLESS LEG SYNDROME: Primary | ICD-10-CM

## 2025-08-04 DIAGNOSIS — M79.7 FIBROMYALGIA: ICD-10-CM

## 2025-08-04 PROCEDURE — 99214 OFFICE O/P EST MOD 30 MIN: CPT | Performed by: PSYCHIATRY & NEUROLOGY

## 2025-08-06 LAB — CERULOPLASMIN SERPL-MCNC: 30 MG/DL (ref 16–45)

## 2025-08-07 LAB — COPPER SERPL-MCNC: 133.7 UG/DL (ref 80–155)

## 2025-08-10 PROBLEM — G47.33 OSA (OBSTRUCTIVE SLEEP APNEA): Status: ACTIVE | Noted: 2025-08-10

## 2025-08-11 DIAGNOSIS — G47.33 SLEEP APNEA, OBSTRUCTIVE: ICD-10-CM

## 2025-08-18 ENCOUNTER — FOLLOWUP TELEPHONE ENCOUNTER (OUTPATIENT)
Dept: SLEEP CENTER | Age: 60
End: 2025-08-18

## (undated) DEVICE — CANN SMPL SOFTECH BIFLO ETCO2 A/M 7FT